# Patient Record
Sex: FEMALE | Employment: OTHER | URBAN - METROPOLITAN AREA
[De-identification: names, ages, dates, MRNs, and addresses within clinical notes are randomized per-mention and may not be internally consistent; named-entity substitution may affect disease eponyms.]

---

## 2024-06-04 PROBLEM — Z85.3 HISTORY OF BREAST CANCER: Status: ACTIVE | Noted: 2024-06-04

## 2024-06-04 PROBLEM — J41.0 SIMPLE CHRONIC BRONCHITIS (HCC): Status: ACTIVE | Noted: 2018-03-20

## 2024-06-04 PROBLEM — E66.01 OBESITY, MORBID (HCC): Status: ACTIVE | Noted: 2022-10-08

## 2024-06-04 PROBLEM — M1A.09X0 IDIOPATHIC CHRONIC GOUT OF MULTIPLE SITES WITHOUT TOPHUS: Status: ACTIVE | Noted: 2018-10-05

## 2024-06-04 PROBLEM — E11.49 TYPE 2 DIABETES MELLITUS WITH NEUROLOGIC COMPLICATION, WITH LONG-TERM CURRENT USE OF INSULIN (HCC): Status: ACTIVE | Noted: 2018-03-20

## 2024-06-04 PROBLEM — I50.22 CHRONIC SYSTOLIC HEART FAILURE (HCC): Status: ACTIVE | Noted: 2018-08-17

## 2024-06-04 PROBLEM — Z79.4 TYPE 2 DIABETES MELLITUS WITH NEUROLOGIC COMPLICATION, WITH LONG-TERM CURRENT USE OF INSULIN (HCC): Status: ACTIVE | Noted: 2018-03-20

## 2024-06-04 PROBLEM — I10 BENIGN ESSENTIAL HYPERTENSION: Status: ACTIVE | Noted: 2024-06-04

## 2024-06-04 PROBLEM — E78.2 MIXED HYPERLIPIDEMIA: Status: ACTIVE | Noted: 2021-11-23

## 2024-06-04 PROBLEM — Z95.810 S/P ICD (INTERNAL CARDIAC DEFIBRILLATOR) PROCEDURE: Status: ACTIVE | Noted: 2017-08-31

## 2024-06-04 PROBLEM — J43.9 PULMONARY EMPHYSEMA (HCC): Status: ACTIVE | Noted: 2018-03-19

## 2024-06-04 PROBLEM — J45.30 MILD PERSISTENT ASTHMA: Status: ACTIVE | Noted: 2021-11-15

## 2024-06-04 PROBLEM — Z94.1: Status: ACTIVE | Noted: 2020-07-16

## 2024-06-04 PROBLEM — E11.40 DIABETIC NEUROPATHY (HCC): Status: ACTIVE | Noted: 2018-08-06

## 2024-06-04 PROBLEM — D84.9 IMMUNOSUPPRESSED STATUS (HCC): Status: ACTIVE | Noted: 2023-03-28

## 2024-06-04 PROBLEM — R87.610 ATYPICAL SQUAMOUS CELLS OF UNDETERMINED SIGNIFICANCE (ASCUS) ON PAPANICOLAOU SMEAR OF CERVIX: Status: ACTIVE | Noted: 2022-09-29

## 2024-06-05 ENCOUNTER — OFFICE VISIT (OUTPATIENT)
Age: 64
End: 2024-06-05
Payer: COMMERCIAL

## 2024-06-05 VITALS
BODY MASS INDEX: 39.92 KG/M2 | SYSTOLIC BLOOD PRESSURE: 115 MMHG | DIASTOLIC BLOOD PRESSURE: 77 MMHG | OXYGEN SATURATION: 95 % | WEIGHT: 248.4 LBS | HEIGHT: 66 IN | RESPIRATION RATE: 14 BRPM | HEART RATE: 108 BPM

## 2024-06-05 DIAGNOSIS — Z79.4 TYPE 2 DIABETES MELLITUS WITH DIABETIC POLYNEUROPATHY, WITH LONG-TERM CURRENT USE OF INSULIN (HCC): ICD-10-CM

## 2024-06-05 DIAGNOSIS — I50.22 CHRONIC SYSTOLIC HEART FAILURE (HCC): ICD-10-CM

## 2024-06-05 DIAGNOSIS — E11.42 TYPE 2 DIABETES MELLITUS WITH DIABETIC POLYNEUROPATHY, WITH LONG-TERM CURRENT USE OF INSULIN (HCC): ICD-10-CM

## 2024-06-05 DIAGNOSIS — R42 VERTIGO: Primary | ICD-10-CM

## 2024-06-05 DIAGNOSIS — I10 BENIGN ESSENTIAL HYPERTENSION: ICD-10-CM

## 2024-06-05 PROBLEM — Z85.3 HISTORY OF BREAST CANCER: Status: RESOLVED | Noted: 2024-06-04 | Resolved: 2024-06-05

## 2024-06-05 PROCEDURE — 3078F DIAST BP <80 MM HG: CPT | Performed by: INTERNAL MEDICINE

## 2024-06-05 PROCEDURE — 3074F SYST BP LT 130 MM HG: CPT | Performed by: INTERNAL MEDICINE

## 2024-06-05 PROCEDURE — 99214 OFFICE O/P EST MOD 30 MIN: CPT | Performed by: INTERNAL MEDICINE

## 2024-06-05 RX ORDER — AMLODIPINE BESYLATE 5 MG/1
5 TABLET ORAL DAILY
COMMUNITY
Start: 2024-05-13

## 2024-06-05 RX ORDER — FLUTICASONE PROPIONATE AND SALMETEROL XINAFOATE 230; 21 UG/1; UG/1
2 AEROSOL, METERED RESPIRATORY (INHALATION) 2 TIMES DAILY
COMMUNITY

## 2024-06-05 RX ORDER — LOSARTAN POTASSIUM 25 MG/1
25 TABLET ORAL DAILY
COMMUNITY

## 2024-06-05 RX ORDER — TACROLIMUS 1 MG/1
1 CAPSULE ORAL 2 TIMES DAILY
COMMUNITY

## 2024-06-05 RX ORDER — ALBUTEROL SULFATE 90 UG/1
2 AEROSOL, METERED RESPIRATORY (INHALATION) EVERY 4 HOURS PRN
COMMUNITY
Start: 2016-05-18

## 2024-06-05 RX ORDER — MECLIZINE HYDROCHLORIDE 25 MG/1
25 TABLET ORAL 3 TIMES DAILY PRN
COMMUNITY

## 2024-06-05 RX ORDER — ASCORBIC ACID 500 MG
1 TABLET ORAL DAILY
COMMUNITY
Start: 2023-12-21

## 2024-06-05 RX ORDER — ATORVASTATIN CALCIUM 40 MG/1
40 TABLET, FILM COATED ORAL DAILY
COMMUNITY
Start: 2024-03-15 | End: 2025-03-15

## 2024-06-05 RX ORDER — ASPIRIN 81 MG/1
81 TABLET ORAL DAILY
COMMUNITY

## 2024-06-05 RX ORDER — FAMOTIDINE 20 MG/1
20 TABLET, FILM COATED ORAL 2 TIMES DAILY
COMMUNITY

## 2024-06-05 RX ORDER — TORSEMIDE 20 MG/1
20 TABLET ORAL DAILY PRN
COMMUNITY

## 2024-06-05 RX ORDER — UREA 10 %
325 LOTION (ML) TOPICAL
COMMUNITY

## 2024-06-05 RX ORDER — PREDNISONE 5 MG/1
5 TABLET ORAL DAILY
COMMUNITY

## 2024-06-05 RX ORDER — FLUTICASONE PROPIONATE 50 MCG
1 SPRAY, SUSPENSION (ML) NASAL DAILY
COMMUNITY

## 2024-06-05 RX ORDER — EZETIMIBE 10 MG/1
10 TABLET ORAL DAILY
COMMUNITY

## 2024-06-05 RX ORDER — SULFAMETHOXAZOLE AND TRIMETHOPRIM 400; 80 MG/1; MG/1
0.5 TABLET ORAL DAILY
COMMUNITY

## 2024-06-05 RX ORDER — DULOXETIN HYDROCHLORIDE 60 MG/1
60 CAPSULE, DELAYED RELEASE ORAL DAILY
COMMUNITY
Start: 2021-05-05

## 2024-06-05 RX ORDER — ACETAMINOPHEN 500 MG
1000 TABLET ORAL 2 TIMES DAILY PRN
COMMUNITY

## 2024-06-05 RX ORDER — MYCOPHENOLATE MOFETIL 500 MG/1
TABLET ORAL 2 TIMES DAILY
COMMUNITY

## 2024-06-05 SDOH — ECONOMIC STABILITY: HOUSING INSECURITY
IN THE LAST 12 MONTHS, WAS THERE A TIME WHEN YOU DID NOT HAVE A STEADY PLACE TO SLEEP OR SLEPT IN A SHELTER (INCLUDING NOW)?: NO

## 2024-06-05 SDOH — ECONOMIC STABILITY: FOOD INSECURITY: WITHIN THE PAST 12 MONTHS, YOU WORRIED THAT YOUR FOOD WOULD RUN OUT BEFORE YOU GOT MONEY TO BUY MORE.: NEVER TRUE

## 2024-06-05 SDOH — ECONOMIC STABILITY: FOOD INSECURITY: WITHIN THE PAST 12 MONTHS, THE FOOD YOU BOUGHT JUST DIDN'T LAST AND YOU DIDN'T HAVE MONEY TO GET MORE.: NEVER TRUE

## 2024-06-05 SDOH — ECONOMIC STABILITY: INCOME INSECURITY: HOW HARD IS IT FOR YOU TO PAY FOR THE VERY BASICS LIKE FOOD, HOUSING, MEDICAL CARE, AND HEATING?: NOT HARD AT ALL

## 2024-06-05 ASSESSMENT — PATIENT HEALTH QUESTIONNAIRE - PHQ9
SUM OF ALL RESPONSES TO PHQ QUESTIONS 1-9: 0
1. LITTLE INTEREST OR PLEASURE IN DOING THINGS: NOT AT ALL
2. FEELING DOWN, DEPRESSED OR HOPELESS: NOT AT ALL
SUM OF ALL RESPONSES TO PHQ9 QUESTIONS 1 & 2: 0

## 2024-06-05 NOTE — PROGRESS NOTES
A/P:      1. Vertigo  Intermittent, but persistent since February. Agree with CT head, as recommended. As she cannot afford physical therapy for vestibular management, referring to ENT today.  - [facility] - Dawson Kaur MD, Otolaryngology, [place]    2. Benign essential hypertension  Controlled on losartan, amlodipine, and torsemide.    3. Chronic systolic heart failure (HCC)  Status post heart transplant, with last LVEF 53% by cardiac MRI. Continue regimen per transplant surgery and cardiology.     4. Type 2 diabetes mellitus with diabetic polyneuropathy, with long-term current use of insulin ([facility])  Followed by endocrinology, but they are not completing her paperwork for the prescribed Ozempic.  Endocrinology notes reviewed, and it appears they are attempting to put her on Ozempic 0.25 mg weekly.  I completed her patient assistance forms today and we will fax them.       Follow up:  3 months for AWV          An electronic signature was used to authenticate this note.    --Cat Santo MD         HPI: Zonia Mueller is here for a follow up visit:      Dizziness: Ms. Mueller has been having issues with dizziness, vertigo that started when she had a routine cardiac catheterization. She was referred to PT, did not have the vestibular therapy though--she stopped the shoulder rehab when she realized she was having a bill over $100. Her left shoulder is still a chronic pain. At her transplant visit, she was told they will set up a CT head to rule out a stroke with her cath in February. Her next appointment with ortho for her L shoulder adhesive capsulitis is soon.     HTN: She is taking losartan for blood pressure daily without side effects. Denies chest pain, dyspnea, leg swelling.    Cholesterol: Ms. Robles is taking medication for cholesterol as directed, just added Zetia after labs 5/22/23.     Gout: This has not been an issue, she is not on medication for this lately. Uric acid level was just over 6 in

## 2024-06-13 ENCOUNTER — TELEPHONE (OUTPATIENT)
Age: 64
End: 2024-06-13

## 2024-06-13 NOTE — TELEPHONE ENCOUNTER
Spoke with patient and updated on Dr. Santo's comment regarding and urgent ENT referral. Advised that Dr. Santo will wait until she gets the results of the Ct and then will determine if she can classify an urgent referral. She states understanding and grateful for the call.

## 2024-06-13 NOTE — TELEPHONE ENCOUNTER
Regarding: No message from patient  ----- Message from Cat Santo MD sent at 6/13/2024 10:10 AM EDT -----    This encounter does not contain a message from the patient.

## 2024-07-01 ENCOUNTER — TELEPHONE (OUTPATIENT)
Age: 64
End: 2024-07-01

## 2024-07-01 NOTE — TELEPHONE ENCOUNTER
Patient called to inform the office that [employer] will be shipping medication here  (Ozempic) to our office. She stated that she will like the office to give her a call once medication has arrived [phone]

## 2024-08-05 RX ORDER — DULOXETIN HYDROCHLORIDE 60 MG/1
60 CAPSULE, DELAYED RELEASE ORAL DAILY
Qty: 90 CAPSULE | Refills: 4 | Status: SHIPPED | OUTPATIENT
Start: 2024-08-05

## 2024-08-05 NOTE — TELEPHONE ENCOUNTER
PCP: Cat Santo MD    Last appt: 6/5/2024   Future Appointments   Date Time Provider Department Center   9/26/2024  9:15 AM Cat Santo MD [facility]       Requested Prescriptions     Pending Prescriptions Disp Refills    DULoxetine (CYMBALTA) 60 MG extended release capsule [Pharmacy Med Name: DULOXETINE HYDROCHLORIDE 60 MG Capsule Delayed Release Particles] 90 capsule 3     Sig: TAKE 1 CAPSULE EVERY DAY     Rx in pending for provider review and approval.    Dianne \"Corwin\" OSMIN Angela

## 2024-09-25 LAB — HBA1C MFR BLD HPLC: 8.9 %

## 2024-10-04 ENCOUNTER — OFFICE VISIT (OUTPATIENT)
Age: 64
End: 2024-10-04
Payer: MEDICARE

## 2024-10-04 VITALS
OXYGEN SATURATION: 96 % | WEIGHT: 255.8 LBS | DIASTOLIC BLOOD PRESSURE: 85 MMHG | HEIGHT: 66 IN | HEART RATE: 108 BPM | SYSTOLIC BLOOD PRESSURE: 133 MMHG | RESPIRATION RATE: 14 BRPM | BODY MASS INDEX: 41.11 KG/M2

## 2024-10-04 DIAGNOSIS — R87.610 ATYPICAL SQUAMOUS CELLS OF UNDETERMINED SIGNIFICANCE (ASCUS) ON PAPANICOLAOU SMEAR OF CERVIX: ICD-10-CM

## 2024-10-04 DIAGNOSIS — Z79.4 TYPE 2 DIABETES MELLITUS WITH DIABETIC POLYNEUROPATHY, WITH LONG-TERM CURRENT USE OF INSULIN (HCC): ICD-10-CM

## 2024-10-04 DIAGNOSIS — Z12.31 ENCOUNTER FOR SCREENING MAMMOGRAM FOR BREAST CANCER: ICD-10-CM

## 2024-10-04 DIAGNOSIS — E11.42 TYPE 2 DIABETES MELLITUS WITH DIABETIC POLYNEUROPATHY, WITH LONG-TERM CURRENT USE OF INSULIN (HCC): ICD-10-CM

## 2024-10-04 DIAGNOSIS — E11.42 DIABETIC POLYNEUROPATHY ASSOCIATED WITH TYPE 2 DIABETES MELLITUS (HCC): ICD-10-CM

## 2024-10-04 DIAGNOSIS — Z00.00 MEDICARE ANNUAL WELLNESS VISIT, SUBSEQUENT: Primary | ICD-10-CM
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
## 2024-10-04 DIAGNOSIS — M1A.09X0 IDIOPATHIC CHRONIC GOUT OF MULTIPLE SITES WITHOUT TOPHUS: ICD-10-CM

## 2024-10-04 PROCEDURE — 99214 OFFICE O/P EST MOD 30 MIN: CPT | Performed by: INTERNAL MEDICINE

## 2024-10-04 RX ORDER — POTASSIUM CHLORIDE 1500 MG/1
20 TABLET, EXTENDED RELEASE ORAL PRN
COMMUNITY
Start: 2024-09-27 | End: 2025-09-27

## 2024-10-04 RX ORDER — SEMAGLUTIDE 1.34 MG/ML
0.5 INJECTION, SOLUTION SUBCUTANEOUS
COMMUNITY

## 2024-10-04 RX ORDER — INSULIN LISPRO 100 [IU]/ML
INJECTION, SOLUTION INTRAVENOUS; SUBCUTANEOUS
COMMUNITY
Start: 2024-09-09

## 2024-10-04 RX ORDER — HYDROXYZINE HYDROCHLORIDE 25 MG/1
TABLET, FILM COATED ORAL
Qty: 30 TABLET | Refills: 0 | Status: SHIPPED | OUTPATIENT
Start: 2024-10-04

## 2024-10-04 ASSESSMENT — PATIENT HEALTH QUESTIONNAIRE - PHQ9
SUM OF ALL RESPONSES TO PHQ QUESTIONS 1-9: 0
SUM OF ALL RESPONSES TO PHQ9 QUESTIONS 1 & 2: 0
1. LITTLE INTEREST OR PLEASURE IN DOING THINGS: NOT AT ALL
SUM OF ALL RESPONSES TO PHQ QUESTIONS 1-9: 0
2. FEELING DOWN, DEPRESSED OR HOPELESS: NOT AT ALL

## 2024-10-04 NOTE — PATIENT INSTRUCTIONS
Try the following for Audiology:     [facility]  [address]  [address]  [place] [address]  [phone]    [facility]  [address]  [place] [address] [phone]        Eating Healthy Foods: Care Instructions  With every meal, you can make healthy food choices. Try to eat a variety of fruits, vegetables, whole grains, lean proteins, and low-fat dairy products. This can help you get the right balance of nutrients, including vitamins and minerals. Small changes add up over time. You can start by adding one healthy food to your meals each day.    Try to make half your plate fruits and vegetables, one-fourth whole grains, and one-fourth lean proteins. Try including dairy with your meals.   Eat more fruits and vegetables. Try to have them with most meals and snacks.   Foods for healthy eating        Fruits   These can be fresh, frozen, canned, or dried.  Try to choose whole fruit rather than fruit juice.  Eat a variety of colors.        Vegetables   These can be fresh, frozen, canned, or dried.  Beans, peas, and lentils count too.        Whole grains   Choose whole-grain breads, cereals, and noodles.  Try brown rice.        Lean proteins   These can include lean meat, poultry, fish, and eggs.  You can also have tofu, beans, peas, lentils, nuts, and seeds.        Dairy   Try milk, yogurt, and cheese.  Choose low-fat or fat-free when you can.  If you need to, use lactose-free milk or fortified plant-based milk products, such as soy milk.        Water   Drink water when you're thirsty.  Limit sugar-sweetened drinks, including soda, fruit drinks, and sports drinks.  Where can you learn more?  Go to https://[email or web address].net/patientEd and enter T756 to learn more about \"Eating Healthy Foods: Care Instructions.\"  Current as of: September 20, 2023  Content Version: 14.2  © 2024 [employer].   Care instructions adapted under license by [employer]. If you have

## 2024-10-04 NOTE — PROGRESS NOTES
morning Yes Rakan Miller MD   HUMALOG KWIKPEN 100 UNIT/ML SOPN INJECT 12 UNITS UNDER THE SKIN 3 TIMES A DAY WITH MEALS. MAY USE UP TO 45U DAILY. Yes Rakan Millre MD   Semaglutide,0.25 or 0.5MG/DOS, (OZEMPIC, 0.25 OR 0.5 MG/DOSE,) 2 MG/1.5ML SOPN Inject 0.5 mg into the skin every 7 days Yes Rakan Miller MD   DULoxetine (CYMBALTA) 60 MG extended release capsule TAKE 1 CAPSULE EVERY DAY Yes Cat Santo MD   acetaminophen (TYLENOL) 500 MG tablet Take 2 tablets by mouth 2 times daily as needed Yes ProviderRakan MD   albuterol sulfate HFA (PROVENTIL;VENTOLIN;PROAIR) 108 (90 Base) MCG/ACT inhaler Inhale 2 puffs into the lungs every 4 hours as needed Yes ProviderRakan MD   amLODIPine (NORVASC) 5 MG tablet Take 1 tablet by mouth daily Yes ProviderRakan MD   vitamin C (ASCORBIC ACID) 500 MG tablet Take 1 tablet by mouth daily Yes Provider, MD Rakan   aspirin 81 MG EC tablet Take 1 tablet by mouth daily Yes ProviderRakan MD   atorvastatin (LIPITOR) 40 MG tablet Take 1 tablet by mouth daily Yes ProviderRakan MD   Cetirizine HCl 10 MG CAPS Take 10 mg by mouth daily Yes ProviderRakan MD   losartan (COZAAR) 25 MG tablet Take 1 tablet by mouth daily Yes ProviderRakan MD   tacrolimus (PROGRAF) 1 MG capsule Take 1 capsule by mouth 2 times daily 5 in am, 4 at bed time Yes Rakan Miller MD   ezetimibe (ZETIA) 10 MG tablet Take 1 tablet by mouth daily Yes ProviderRakan MD   fluticasone (FLONASE) 50 MCG/ACT nasal spray 1 spray by Each Nostril route daily Yes Rakan Miller MD   meclizine (ANTIVERT) 25 MG tablet Take 1 tablet by mouth 3 times daily as needed Yes Rakan Miller MD   sulfamethoxazole-trimethoprim (BACTRIM;SEPTRA) 400-80 MG per tablet Take 0.5 tablets by mouth daily Yes Rakan Miller MD   fluticasone-salmeterol (ADVAIR HFA) 230-21 MCG/ACT inhaler Inhale 2 puffs into the lungs 2 times daily Yes

## 2024-10-16 NOTE — PROGRESS NOTES
External lab results entered. See labs for [facility] labs.    Anupama Driver, PharmD, BCPS, CDE    For Pharmacy Admin Tracking Only    Program: Medical Group  CPA in place:  Yes  Recommendation Provided To: Other: 0  Intervention Detail:   Intervention Accepted By: Other: 0  Gap Closed?: Yes   Time Spent (min): 5

## 2024-10-23 NOTE — PROGRESS NOTES
Zonia Mueller is a 64 y.o. female presents for a problem visit.    Chief Complaint   Patient presents with    HPV+ follow up   ASCUS on papanicolaou smear of cervix    No LMP recorded (lmp unknown). Patient is postmenopausal.  Birth Control: none.  Last Pap: no record    The patient is reporting having: no concern today. HPV positive 2 years ago, PT likes to follow up      Examination chaperoned by Alexandra Loco MA.

## 2024-10-24 ENCOUNTER — HOSPITAL ENCOUNTER (OUTPATIENT)
Facility: HOSPITAL | Age: 64
Setting detail: SPECIMEN
Discharge: HOME | End: 2024-10-27
Payer: MEDICARE

## 2024-10-24 ENCOUNTER — OFFICE VISIT (OUTPATIENT)
Age: 64
End: 2024-10-24
Payer: MEDICARE

## 2024-10-24 VITALS
DIASTOLIC BLOOD PRESSURE: 80 MMHG | SYSTOLIC BLOOD PRESSURE: 131 MMHG | BODY MASS INDEX: 39.86 KG/M2 | HEART RATE: 120 BPM | WEIGHT: 248 LBS | HEIGHT: 66 IN

## 2024-10-24 DIAGNOSIS — N95.1 VASOMOTOR SYMPTOMS DUE TO MENOPAUSE: Primary | ICD-10-CM

## 2024-10-24 DIAGNOSIS — Z12.4 CERVICAL CANCER SCREENING: ICD-10-CM

## 2024-10-24 PROCEDURE — 87624 HPV HI-RISK TYP POOLED RSLT: CPT

## 2024-10-24 PROCEDURE — G8484 FLU IMMUNIZE NO ADMIN: HCPCS | Performed by: STUDENT IN AN ORGANIZED HEALTH CARE EDUCATION/TRAINING PROGRAM

## 2024-10-24 PROCEDURE — 99204 OFFICE O/P NEW MOD 45 MIN: CPT | Performed by: STUDENT IN AN ORGANIZED HEALTH CARE EDUCATION/TRAINING PROGRAM

## 2024-10-24 PROCEDURE — G8427 DOCREV CUR MEDS BY ELIG CLIN: HCPCS | Performed by: STUDENT IN AN ORGANIZED HEALTH CARE EDUCATION/TRAINING PROGRAM

## 2024-10-24 PROCEDURE — G8417 CALC BMI ABV UP PARAM F/U: HCPCS | Performed by: STUDENT IN AN ORGANIZED HEALTH CARE EDUCATION/TRAINING PROGRAM

## 2024-10-24 PROCEDURE — 3079F DIAST BP 80-89 MM HG: CPT | Performed by: STUDENT IN AN ORGANIZED HEALTH CARE EDUCATION/TRAINING PROGRAM

## 2024-10-24 PROCEDURE — 1036F TOBACCO NON-USER: CPT | Performed by: STUDENT IN AN ORGANIZED HEALTH CARE EDUCATION/TRAINING PROGRAM

## 2024-10-24 PROCEDURE — 88175 CYTOPATH C/V AUTO FLUID REDO: CPT

## 2024-10-24 PROCEDURE — 87625 HPV TYPES 16 & 18 ONLY: CPT

## 2024-10-24 PROCEDURE — 3017F COLORECTAL CA SCREEN DOC REV: CPT | Performed by: STUDENT IN AN ORGANIZED HEALTH CARE EDUCATION/TRAINING PROGRAM

## 2024-10-24 PROCEDURE — 3075F SYST BP GE 130 - 139MM HG: CPT | Performed by: STUDENT IN AN ORGANIZED HEALTH CARE EDUCATION/TRAINING PROGRAM

## 2024-10-24 RX ORDER — GABAPENTIN 300 MG/1
300 CAPSULE ORAL 2 TIMES DAILY
Qty: 60 CAPSULE | Refills: 2 | Status: SHIPPED | OUTPATIENT
Start: 2024-10-24 | End: 2024-11-23

## 2024-10-24 RX ORDER — GABAPENTIN 300 MG/1
300 CAPSULE ORAL NIGHTLY
Qty: 30 CAPSULE | Refills: 0 | Status: SHIPPED | OUTPATIENT
Start: 2024-10-24 | End: 2024-11-23

## 2024-10-24 NOTE — PROGRESS NOTES
OB/GYN Problem VIsit    HPI  Zonia Mueller is a ,  64 y.o. female who presents for a problem visit.     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 Hasn't had follow up since.    Also reports frequent hot flashes, worse x 1 yr since stopping gabapentin. Was previously on for diabetic neuropathy.     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    Herpes zoster     Menopausal symptoms     Severe dysplasia of cervix (CHIDI III)      Past Surgical History:   Procedure Laterality Date    CARDIAC DEFIBRILLATOR PLACEMENT      COLONOSCOPY      HEART TRANSPLANT  2019    LEEP      CIN3, negative margins     Social History     Occupational History    Occupation: Retired   Tobacco Use    Smoking status: Former     Current packs/day: 0.00     Average packs/day: 1.5 packs/day for 15.0 years (22.5 ttl pk-yrs)     Types: Cigarettes     Quit date: 2014     Years since quitting: 10.8    Smokeless tobacco: Never   Substance and Sexual Activity    Alcohol use: Yes     Alcohol/week: 2.0 standard drinks of alcohol     Types: 2 Glasses of wine per week    Drug use: Never    Sexual activity: Not Currently     Partners: Male     Family History   Problem Relation Age of Onset    Ovarian Cancer Mother     Diabetes Mother     Arthritis Mother     Hypertension Mother     Elevated Lipids Mother     Rheum Arthritis Mother     Diabetes Father     Hypertension Father     Heart Failure Father     High Blood Pressure Father     Stroke Father     Vision Loss Father     Asthma Sister     Heart Attack Brother     Obesity Brother        Allergies   Allergen Reactions    Beta Adrenergic Blockers Shortness Of Breath and Other (See Comments)     Reaction Type: Allergy    Lisinopril Angioedema, Cough, Dizziness or Vertigo and Other (See Comments)     hypotension    hypotension   hypotension    Sulfamethoxazole-Trimethoprim Other (See Comments) and

## 2024-10-29 LAB — HPV I/H RISK 1 DNA CVX QL PROBE+SIG AMP: POSITIVE

## 2024-11-08 ENCOUNTER — HOSPITAL ENCOUNTER (OUTPATIENT)
Facility: HOSPITAL | Age: 64
Setting detail: SPECIMEN
Discharge: HOME | End: 2024-11-11

## 2024-11-08 ENCOUNTER — PROCEDURE VISIT (OUTPATIENT)
Age: 64
End: 2024-11-08

## 2024-11-08 VITALS
OXYGEN SATURATION: 94 % | HEIGHT: 66 IN | DIASTOLIC BLOOD PRESSURE: 74 MMHG | SYSTOLIC BLOOD PRESSURE: 134 MMHG | WEIGHT: 254 LBS | HEART RATE: 115 BPM | TEMPERATURE: 98 F | BODY MASS INDEX: 40.82 KG/M2

## 2024-11-08 DIAGNOSIS — R87.618 PAP SMEAR ABNORMALITY OF CERVIX/HUMAN PAPILLOMAVIRUS (HPV) POSITIVE: Primary | ICD-10-CM

## 2024-11-08 PROCEDURE — 88305 TISSUE EXAM BY PATHOLOGIST: CPT

## 2024-11-08 PROCEDURE — 88342 IMHCHEM/IMCYTCHM 1ST ANTB: CPT

## 2024-11-08 NOTE — PROGRESS NOTES
Procedure Note: Colposcopy    Zonia Mueller is a ,  64 y.o. female Black /   Unavailable No LMP recorded (lmp unknown). Patient is postmenopausal.  She presents for colposcopy for evaluation of a cervical abnormality with a pap smear abnormality consisting of HRHPV, NILM. After being presented with the risks, benefits and alternatives has she signed a consent for the procedure. She states that she understands the need for the procedure and has no further questions. She was informed that she may experience discomfort.  Procedure:  She was positioned in the dorsal lithotomy position and a speculum was inserted into the vagina. Dilute acetic acid was applied to the cervix. The colposcope was used to visualize the cervix. Procedure: The transformation zone was completely visualized.  Findings:   This colposcopy was satisfactory. No acetowhite changes visualized on the cervix. Biopsies were taken from the cervix at 12.   Endocervical currettage: An endocervical curettage was performed. Monsels was applied to the cervix.  Post Procedure Status: The patient tolerated the procedure well with minimal discomfort.   She was observed for 10 minutes and released in good condition.    MD Lester Leary OB/Gyn

## 2024-11-08 NOTE — PROGRESS NOTES
Zonia Mueller is a 64 y.o. female presents for a problem visit.    Chief Complaint   Patient presents with    Colposcopy       Problems:  no      No LMP recorded (lmp unknown). Patient is postmenopausal.  Birth Control: post menopausal status.  Last Pap: abnormal obtained 10/24/2024.        Chart reviewed for the following:   Alexandra CLARK MA, have reviewed the History, Physical and updated the Allergic reactions for Zonia Mueller     TIME OUT performed immediately prior to start of procedure:   Alexandra CLARK MA, have performed the following reviews on Zonia Mueller prior to the start of the procedure:            * Patient was identified by name and date of birth   * Agreement on procedure being performed was verified  * Risks and Benefits explained to the patient  * Procedure site verified and marked as necessary  * Patient was positioned for comfort  * Consent was signed and verified     Time: 8:50am    Date of procedure: 11/8/2024    Procedure performed by:  Fadia Queen MD       Provider assisted by: Alexandra Loco MA    Patient assisted by: self    How tolerated by patient: tolerated the procedure well with no complications    Post Procedural Pain Scale: 0 - No Hurt    Comments: none    Examination chaperoned by Alexandra Loco MA.